# Patient Record
Sex: MALE | Race: WHITE
[De-identification: names, ages, dates, MRNs, and addresses within clinical notes are randomized per-mention and may not be internally consistent; named-entity substitution may affect disease eponyms.]

---

## 2017-05-19 ENCOUNTER — HOSPITAL ENCOUNTER (EMERGENCY)
Dept: HOSPITAL 47 - EC | Age: 62
Discharge: HOME | End: 2017-05-19
Payer: MEDICARE

## 2017-05-19 VITALS — SYSTOLIC BLOOD PRESSURE: 105 MMHG | DIASTOLIC BLOOD PRESSURE: 59 MMHG | HEART RATE: 78 BPM

## 2017-05-19 VITALS — TEMPERATURE: 98.3 F

## 2017-05-19 VITALS — RESPIRATION RATE: 18 BRPM

## 2017-05-19 DIAGNOSIS — F03.90: ICD-10-CM

## 2017-05-19 DIAGNOSIS — K11.20: Primary | ICD-10-CM

## 2017-05-19 DIAGNOSIS — J32.9: ICD-10-CM

## 2017-05-19 DIAGNOSIS — Z79.899: ICD-10-CM

## 2017-05-19 DIAGNOSIS — Z79.82: ICD-10-CM

## 2017-05-19 DIAGNOSIS — Z79.51: ICD-10-CM

## 2017-05-19 DIAGNOSIS — D17.0: ICD-10-CM

## 2017-05-19 LAB
ALP SERPL-CCNC: 167 U/L (ref 38–126)
ALT SERPL-CCNC: 30 U/L (ref 21–72)
AMYLASE SERPL-CCNC: 95 U/L (ref 30–110)
ANION GAP SERPL CALC-SCNC: 13 MMOL/L
AST SERPL-CCNC: 30 U/L (ref 17–59)
BASOPHILS # BLD AUTO: 0.1 K/UL (ref 0–0.2)
BASOPHILS NFR BLD AUTO: 1 %
BUN SERPL-SCNC: 29 MG/DL (ref 9–20)
CALCIUM SPEC-MCNC: 9.1 MG/DL (ref 8.4–10.2)
CH: 34.6
CHCM: 34
CHLORIDE SERPL-SCNC: 106 MMOL/L (ref 98–107)
CO2 SERPL-SCNC: 22 MMOL/L (ref 22–30)
EOSINOPHIL # BLD AUTO: 0.2 K/UL (ref 0–0.7)
EOSINOPHIL NFR BLD AUTO: 2 %
ERYTHROCYTE [DISTWIDTH] IN BLOOD BY AUTOMATED COUNT: 3.82 M/UL (ref 4.3–5.9)
ERYTHROCYTE [DISTWIDTH] IN BLOOD: 13.5 % (ref 11.5–15.5)
GLUCOSE SERPL-MCNC: 100 MG/DL (ref 74–99)
HCT VFR BLD AUTO: 39.1 % (ref 39–53)
HDW: 2.32
HGB BLD-MCNC: 13.2 GM/DL (ref 13–17.5)
LUC NFR BLD AUTO: 2 %
LYMPHOCYTES # SPEC AUTO: 1.1 K/UL (ref 1–4.8)
LYMPHOCYTES NFR SPEC AUTO: 10 %
MCH RBC QN AUTO: 34.6 PG (ref 25–35)
MCHC RBC AUTO-ENTMCNC: 33.9 G/DL (ref 31–37)
MCV RBC AUTO: 102.3 FL (ref 80–100)
MONOCYTES # BLD AUTO: 1.1 K/UL (ref 0–1)
MONOCYTES NFR BLD AUTO: 10 %
NEUTROPHILS # BLD AUTO: 8.7 K/UL (ref 1.3–7.7)
NEUTROPHILS NFR BLD AUTO: 76 %
NON-AFRICAN AMERICAN GFR(MDRD): >60
POTASSIUM SERPL-SCNC: 4.2 MMOL/L (ref 3.5–5.1)
PROT SERPL-MCNC: 7.8 G/DL (ref 6.3–8.2)
SODIUM SERPL-SCNC: 141 MMOL/L (ref 137–145)
WBC # BLD AUTO: 0.25 10*3/UL
WBC # BLD AUTO: 11.4 K/UL (ref 3.8–10.6)
WBC (PEROX): 10.81

## 2017-05-19 PROCEDURE — 85025 COMPLETE CBC W/AUTO DIFF WBC: CPT

## 2017-05-19 PROCEDURE — 80053 COMPREHEN METABOLIC PANEL: CPT

## 2017-05-19 PROCEDURE — 99284 EMERGENCY DEPT VISIT MOD MDM: CPT

## 2017-05-19 PROCEDURE — 87040 BLOOD CULTURE FOR BACTERIA: CPT

## 2017-05-19 PROCEDURE — 36415 COLL VENOUS BLD VENIPUNCTURE: CPT

## 2017-05-19 PROCEDURE — 82150 ASSAY OF AMYLASE: CPT

## 2017-05-19 PROCEDURE — 70491 CT SOFT TISSUE NECK W/DYE: CPT

## 2017-05-19 NOTE — ED
Recheck HPI





- General


Chief Complaint: Recheck/Abnormal Lab/Rx


Stated Complaint: abn labs, face swollen


Time Seen by Provider: 05/19/17 14:58


Source: patient, RN notes reviewed


Mode of arrival: ambulatory


Limitations: no limitations





- History of Present Illness


Initial Comments: 





This a 62-year-old male presents emergency Department chief complaint of facial 

swelling, elevated white blood cell count.  Patient states that the swelling 

and redness yesterday went primary care physician's office who ordered lab work 

and told him come emergency department secondary to elevated white blood cell 

count.  Patient some similar to this a few months ago and was diagnosed with 

cellulitis.  They're concerned about his parotid gland or possible abscess.  

Patient denies fever or chills.  Denies any dental pain, difficult to swelling, 

headache, dizziness, chest pain or shortness breath.





- Related Data


 Home Medications











 Medication  Instructions  Recorded  Confirmed


 


Albuterol Inhaler [Ventolin Hfa 2 puff INHALATION RT-BID 05/19/17 05/19/17





Inhaler]   


 


Ascorbic Acid [Vitamin C] 1,000 mg PO W/SUPPER 05/19/17 05/19/17


 


Aspirin EC [Ecotrin Low Dose] 81 mg PO W/SUPPER 05/19/17 05/19/17


 


Atorvastatin [Lipitor] 10 mg PO HS 05/19/17 05/19/17


 


Calcium Carbonate [Calcium] 600 mg PO BID 05/19/17 05/19/17


 


Cholecalciferol [Vitamin D3] 1,000 unit PO W/SUPPER 05/19/17 05/19/17


 


Ciprofloxacin HCl [Cipro] 500 mg PO BID 05/19/17 05/19/17


 


Citalopram Hydrobromide [CeleXA] 20 mg PO DAILY 05/19/17 05/19/17


 


Cyanocobalamin (Vitamin B-12) 2,000 mcg PO QAM 05/19/17 05/19/17





[Vitamin B-12]   


 


Donepezil [Aricept] 10 mg PO QAM 05/19/17 05/19/17


 


Fish Oil/Dha/Epa [Fish Oil 1,200 2 cap PO BID 05/19/17 05/19/17





mg Fish Oil]   


 


Fluticasone/Salmeterol [Advair Hfa 2 puff INHALATION RT-BID 05/19/17 05/19/17





230-21 Mcg Inhaler]   


 


Garlic 1 tab PO W/SUPPER 05/19/17 05/19/17


 


Gatifloxacin [Zymaxid] 1 drop BOTH EYES DAILY PRN 05/19/17 05/19/17


 


Gemfibrozil [Lopid] 600 mg PO AC-BID 05/19/17 05/19/17


 


Magnesium Oxide [Mag-Ox] 400 mg PO W/SUPPER 05/19/17 05/19/17


 


Meloxicam [Mobic] 7.5 mg PO DAILY 05/19/17 05/19/17


 


Memantine [Namenda] 10 mg PO BID 05/19/17 05/19/17


 


Metamucil Wafer 1 wafer PO QAM 05/19/17 05/19/17


 


Niacin (Inositol Niacinate) 400 mg PO BID 05/19/17 05/19/17





[Niacin Flush Free 500 mg Cap]   


 


Omeprazole 20 mg PO DAILY 05/19/17 05/19/17








 Previous Rx's











 Medication  Instructions  Recorded


 


Amoxicillin/Potassium Clav 1 tab PO Q12HR #20 tab 05/19/17





[Augmentin 875-125 Tablet]  











 Allergies











Allergy/AdvReac Type Severity Reaction Status Date / Time


 


No Known Allergies Allergy   Verified 05/19/17 15:42














Review of Systems


ROS Statement: 


Those systems with pertinent positive or pertinent negative responses have been 

documented in the HPI.





ROS Other: All systems not noted in ROS Statement are negative.





Past Medical History


Past Medical History: Dementia


Additional Past Medical History / Comment(s): Downs syndrome


History of Any Multi-Drug Resistant Organisms: None Reported


Past Surgical History: Appendectomy


Additional Past Surgical History / Comment(s): hip replacement Right side. 

lower right lobe from lug removed.


Past Psychological History: No Psychological Hx Reported


Smoking Status: Never smoker


Past Alcohol Use History: None Reported


Past Drug Use History: None Reported





General Exam


Limitations: no limitations


General appearance: alert, in no apparent distress


Head exam: Present: atraumatic, normocephalic, normal inspection


Eye exam: Present: normal appearance, PERRL, EOMI.  Absent: scleral icterus, 

conjunctival injection, periorbital swelling


ENT exam: Present: mucous membranes moist, TM's normal bilaterally, normal 

external ear exam.  Absent: normal exam (Swollen noted along the left side of 

the mandible angle), normal oropharynx (Some missing dentition noted no abscess 

or abnormal lesions noted)


Neck exam: Present: normal inspection, full ROM.  Absent: tenderness, 

meningismus, lymphadenopathy


Respiratory exam: Present: normal lung sounds bilaterally.  Absent: respiratory 

distress, wheezes, rales, rhonchi, stridor


Cardiovascular Exam: Present: regular rate, normal rhythm, normal heart sounds.

  Absent: systolic murmur, diastolic murmur, rubs, gallop, clicks


Neurological exam: Present: alert


Skin exam: Present: warm, dry, intact, normal color.  Absent: rash





Course


 Vital Signs











  05/19/17 05/19/17





  12:37 15:53


 


Temperature 98.3 F 


 


Pulse Rate 64 72


 


Respiratory 20 18





Rate  


 


Blood Pressure 108/55 109/57


 


O2 Sat by Pulse 97 96





Oximetry  














Medical Decision Making





- Medical Decision Making


This is a 62-year-old male presented for facial swelling.  Patient CT does show 

x-ray shows parotic gland though he has a normal amylase.  Patient had 

improvement of his white count 18-11 today.  Case discussed Dr. Moreno and he'll 

be placed on antibiotics for prostatitis.  We did discuss increasing salivation 

with foods and drinks.  Patient was on Cipro and were discussed including 

clindamycin to his Cipro treatment really prefers single antibiotic and which 

she'll be switched to Augmentin.








- Lab Data


Result diagrams: 


 05/19/17 15:38





 05/19/17 15:38


 Lab Results











  05/19/17 05/19/17 Range/Units





  15:38 15:38 


 


WBC   11.4 H  (3.8-10.6)  k/uL


 


RBC   3.82 L  (4.30-5.90)  m/uL


 


Hgb   13.2  (13.0-17.5)  gm/dL


 


Hct   39.1  (39.0-53.0)  %


 


MCV   102.3 H  (80.0-100.0)  fL


 


MCH   34.6  (25.0-35.0)  pg


 


MCHC   33.9  (31.0-37.0)  g/dL


 


RDW   13.5  (11.5-15.5)  %


 


Plt Count   369  (150-450)  k/uL


 


Neutrophils %   76  %


 


Lymphocytes %   10  %


 


Monocytes %   10  %


 


Eosinophils %   2  %


 


Basophils %   1  %


 


Neutrophils #   8.7 H  (1.3-7.7)  k/uL


 


Lymphocytes #   1.1  (1.0-4.8)  k/uL


 


Monocytes #   1.1 H  (0-1.0)  k/uL


 


Eosinophils #   0.2  (0-0.7)  k/uL


 


Basophils #   0.1  (0-0.2)  k/uL


 


Macrocytosis   Slight  


 


Sodium  141   (137-145)  mmol/L


 


Potassium  4.2   (3.5-5.1)  mmol/L


 


Chloride  106   ()  mmol/L


 


Carbon Dioxide  22   (22-30)  mmol/L


 


Anion Gap  13   mmol/L


 


BUN  29 H   (9-20)  mg/dL


 


Creatinine  1.13   (0.66-1.25)  mg/dL


 


Est GFR (MDRD) Af Amer  >60   (>60 ml/min/1.73 sqM)  


 


Est GFR (MDRD) Non-Af  >60   (>60 ml/min/1.73 sqM)  


 


Glucose  100 H   (74-99)  mg/dL


 


Calcium  9.1   (8.4-10.2)  mg/dL


 


Total Bilirubin  0.6   (0.2-1.3)  mg/dL


 


AST  30   (17-59)  U/L


 


ALT  30   (21-72)  U/L


 


Alkaline Phosphatase  167 H   ()  U/L


 


Total Protein  7.8   (6.3-8.2)  g/dL


 


Albumin  3.8   (3.5-5.0)  g/dL


 


Amylase  95   ()  U/L














Disposition


Clinical Impression: 


 Parotitis





Disposition: HOME SELF-CARE


Condition: Stable


Instructions:  Sialoadenitis (ED)


Additional Instructions: 


Please return to the Emergency Department if symptoms worsen or any other 

concerns.


Prescriptions: 


Amoxicillin/Potassium Clav [Augmentin 875-125 Tablet] 1 tab PO Q12HR #20 tab


Referrals: 


Freddie Moreno MD [Primary Care Provider] - 1-2 days


Time of Disposition: 17:53

## 2017-05-19 NOTE — CT
EXAMINATION TYPE: CT soft tissue neck w con

 

DATE OF EXAM: 5/19/2017 4:53 PM

 

COMPARISON: NONE

 

HISTORY: 62-year-old male with neck swelling and pain since yesterday

 

TECHNIQUE: Contiguous axial scanning of the neck performed with IV Contrast, patient injected with 10
0 mL of Omnipaque 300. Coronal/sagittal reconstructions performed.

 

CT DLP: 488.30 mGycm

Automated exposure control for dose reduction was used.

 

FINDINGS: 

Visualized intracranial structures, orbits and globes, mastoid air cells appear within normal limits.
 There is moderate mucosal thickening within the maxillary sinuses and small air-fluid levels in the 
maxillary and left sphenoid sinuses. Leftward nasal septal deviation.

 

Nasopharynx is clear.  Hypertrophy of the lingual tonsils. Some asymmetry of the left aryepiglottic f
old probably due to positioning. Glottic and subglottic airway, trachea, and visualized upper lungs a
ppear clear. The epiglottis and prevertebral soft tissues are within normal limits.

 

Thyroid gland and submandibular glands are within normal limits. Right parotid gland appears atrophic
.

 

There is enlargement and enhancement diffusely throughout the left parotid gland with adjacent subcut
aneous fat stranding and mild tracking edema. There may be some more nodular areas of enhancement speedy
ng the parotid tail, for example, axial image 64.

 

Asymmetric skin thickening along the left side of the face, jaw, and upper neck with reticulations in
 the subcutaneous fat here.

 

Overlying the left platysma muscle at the anterior upper neck, there is a 3.8 x 1.6 cm ovoid fat dens
ity lesion that has mild mass effect onto the underlying platysma suggestive of a lipoma.

 

Borderline enlarged left greater than right upper cervical lymph nodes measuring up to 1.3 cm on the 
left and 1.1 cm on the right. Some asymmetrically enlarged left submandibular space lymph nodes measu
re up to 9 mm.

 

Degenerative changes mid to lower cervical spine.

 

 

 

IMPRESSION: 

 

1. ASYMMETRIC ENLARGEMENT WITH DIFFUSE ENHANCEMENT AND SURROUNDING INFLAMMATION OF THE LEFT PAROTID G
LAND. CORRELATE FOR PAROTITIS.

2. ASSOCIATED CELLULITIS ALONG THE LEFT SIDE OF THE FACE, JAW, AND UPPER NECK.

3. BORDERLINE TO MILDLY ENLARGED LEFT GREATER THAN RIGHT UPPER CERVICAL LYMPH NODES LIKELY REACTIVE. 
RECOMMEND CLINICAL FOLLOW-UP TO ENSURE THAT THESE GRADUALLY DECREASE IN SIZE WITH TREATMENT.

4. INCIDENTAL 3.8 CM LIPOMA ANTERIOR LEFT UPPER NECK OVERLYING THE PLATYSMA.

5. CORRELATE FOR ACUTE ON CHRONIC SINUSITIS..

## 2019-01-18 ENCOUNTER — HOSPITAL ENCOUNTER (OUTPATIENT)
Dept: HOSPITAL 47 - OR | Age: 64
Discharge: HOME | End: 2019-01-18
Attending: DENTIST
Payer: MEDICARE

## 2019-01-18 VITALS — HEART RATE: 85 BPM | DIASTOLIC BLOOD PRESSURE: 54 MMHG | SYSTOLIC BLOOD PRESSURE: 117 MMHG

## 2019-01-18 VITALS — BODY MASS INDEX: 38.5 KG/M2

## 2019-01-18 VITALS — RESPIRATION RATE: 16 BRPM

## 2019-01-18 VITALS — TEMPERATURE: 98.4 F

## 2019-01-18 DIAGNOSIS — Z79.899: ICD-10-CM

## 2019-01-18 DIAGNOSIS — Q90.9: ICD-10-CM

## 2019-01-18 DIAGNOSIS — K13.5: ICD-10-CM

## 2019-01-18 DIAGNOSIS — K13.6: ICD-10-CM

## 2019-01-18 DIAGNOSIS — F02.80: ICD-10-CM

## 2019-01-18 DIAGNOSIS — K14.0: ICD-10-CM

## 2019-01-18 DIAGNOSIS — G30.9: ICD-10-CM

## 2019-01-18 DIAGNOSIS — K12.1: Primary | ICD-10-CM

## 2019-01-18 PROCEDURE — 41116 EXCISION OF MOUTH LESION: CPT

## 2019-01-18 PROCEDURE — 88305 TISSUE EXAM BY PATHOLOGIST: CPT

## 2019-01-18 NOTE — P.OP
Date of Procedure: 01/18/19


Preoperative Diagnosis: 


Nonspecific ulcer right floor of mouth ventral tongue


Postoperative Diagnosis: 


Same


Procedure(s) Performed: 


Excision of ulcer and primary closure


Implants: 


None


Anesthesia: RODRIGO


Surgeon: Luke Hutchins


Estimated Blood Loss (ml): 5


IV fluids (ml): 500


Urine output (ml): 0


Pathology: other (In formalin unable to orient)


Condition: stable


Disposition: PACU


Indications for Procedure: 


Patient had had previous biopsy that was negative in July but the ulcer on the 

tongue had opened post operatively and never healed.  There is a shallow 

noninflammatory ulcer approximately 1 cm x 1 cm on the right floor of mouth 

ventral tongue or the tongue meets the mandible.  This was difficult access in 

the best of scenarios but with the patient's cognitive disabilities as well as 

extreme dry mouth as well as difficult airway of the decision was made to take 

the patient to the hospital and perform under general endotracheal tube 

anesthesia.


Operative Findings: 


Difficult access


Description of Procedure: 


Patient was seen in the preoperative holding area with sister and caretaker 

present consent reviewed including but not limited to bleeding pain infection 

swelling recurrence of disease need for additional procedures in the future 

damage to adjacent structures including but not limited to the lingual nerve 

resulting in numbness or paralysis of the tongue and the submandibular duct 

which resulted in damage to the function of the submandibular gland and 

possible need for removal in the future.





Patient's and taken to the operating room placed in supine position intubated 

easily per the anesthesia record.  Patient was draped in the usual fashion for 

clean contaminated case.  A 1 cm x 1 cm shallow ulcer was confirmed on the 

right ventral tongue floor of mouth.  All the way and the posterior where the 

floor of mouth meets the mandibular gingiva.  Access was very difficult and the 

tongue had to be retracted firmly in order to reach the area.  Excision with 1-

2 mm margins was achieved and primary closure.  During the excision the some 

mandibular duct was visualized and noted to be intact.  Primary closure with 3-

0 chromic gut suture in a interrupted fashion using mattress sutures was 

achieved.  10 sutures were used to close a 2 cm incision.





Postoperative instructions were reviewed with the sister including liquid diet 

until he was seen in the office in 2 weeks.





Plan - Discharge Summary


New Discharge Prescriptions: 


No Action


   Fish Oil/Dha/Epa [Fish Oil 1,200 mg Fish Oil] 1,000 cap PO BID


   Garlic 1 tab PO W/SUPPER


   Magnesium Oxide [Mag-Ox] 250 mg PO W/SUPPER


   Cholecalciferol [Vitamin D3] 2,000 unit PO W/SUPPER


   Calcium Carbonate [Calcium] 600 mg PO BID


   Ascorbic Acid [Vitamin C] 1,000 mg PO W/SUPPER


   Cyanocobalamin (Vitamin B-12) [Vitamin B-12] 1,000 mcg PO QAM


   Atorvastatin [Lipitor] 20 mg PO HS


   Meloxicam [Mobic] 7.5 mg PO DAILY


   Citalopram Hydrobromide [CeleXA] 20 mg PO QAM


   Memantine [Namenda] 10 mg PO BID


   Donepezil [Aricept] 10 mg PO QAM


   Ferrous Sulfate [Iron (65 MG Elemental)] 325 mg PO DAILY


   Menthol/Zinc Oxide [Calmoseptine Ointment] 1 applic TOPICAL DAILY


   Midodrine [ProAmatine] 5 mg PO TID


   Calcium Carbonate [Calcium] 1,000 mg PO DAILY


Discharge Medication List





Ascorbic Acid [Vitamin C] 1,000 mg PO W/SUPPER 05/19/17 [History]


Atorvastatin [Lipitor] 20 mg PO HS 05/19/17 [History]


Calcium Carbonate [Calcium] 600 mg PO BID 05/19/17 [History]


Cholecalciferol [Vitamin D3] 2,000 unit PO W/SUPPER 05/19/17 [History]


Citalopram Hydrobromide [CeleXA] 20 mg PO QAM 05/19/17 [History]


Cyanocobalamin (Vitamin B-12) [Vitamin B-12] 1,000 mcg PO QAM 05/19/17 [History]


Donepezil [Aricept] 10 mg PO QAM 05/19/17 [History]


Fish Oil/Dha/Epa [Fish Oil 1,200 mg Fish Oil] 1,000 cap PO BID 05/19/17 [History

]


Garlic 1 tab PO W/SUPPER 05/19/17 [History]


Magnesium Oxide [Mag-Ox] 250 mg PO W/SUPPER 05/19/17 [History]


Meloxicam [Mobic] 7.5 mg PO DAILY 05/19/17 [History]


Memantine [Namenda] 10 mg PO BID 05/19/17 [History]


Calcium Carbonate [Calcium] 1,000 mg PO DAILY 01/16/19 [History]


Ferrous Sulfate [Iron (65 MG Elemental)] 325 mg PO DAILY 01/16/19 [History]


Menthol/Zinc Oxide [Calmoseptine Ointment] 1 applic TOPICAL DAILY 01/16/19 [

History]


Midodrine [ProAmatine] 5 mg PO TID 01/16/19 [History]

## 2019-04-16 ENCOUNTER — HOSPITAL ENCOUNTER (INPATIENT)
Dept: HOSPITAL 47 - EC | Age: 64
LOS: 2 days | Discharge: HOME | DRG: 194 | End: 2019-04-18
Attending: HOSPITALIST | Admitting: HOSPITALIST
Payer: MEDICARE

## 2019-04-16 DIAGNOSIS — Z80.7: ICD-10-CM

## 2019-04-16 DIAGNOSIS — J12.9: Primary | ICD-10-CM

## 2019-04-16 DIAGNOSIS — J44.0: ICD-10-CM

## 2019-04-16 DIAGNOSIS — F32.9: ICD-10-CM

## 2019-04-16 DIAGNOSIS — R77.9: ICD-10-CM

## 2019-04-16 DIAGNOSIS — J45.21: ICD-10-CM

## 2019-04-16 DIAGNOSIS — Z96.641: ICD-10-CM

## 2019-04-16 DIAGNOSIS — T38.0X5A: ICD-10-CM

## 2019-04-16 DIAGNOSIS — F03.90: ICD-10-CM

## 2019-04-16 DIAGNOSIS — D72.829: ICD-10-CM

## 2019-04-16 DIAGNOSIS — J15.9: ICD-10-CM

## 2019-04-16 DIAGNOSIS — I10: ICD-10-CM

## 2019-04-16 DIAGNOSIS — R09.02: ICD-10-CM

## 2019-04-16 DIAGNOSIS — Z80.41: ICD-10-CM

## 2019-04-16 DIAGNOSIS — E87.2: ICD-10-CM

## 2019-04-16 DIAGNOSIS — F79: ICD-10-CM

## 2019-04-16 DIAGNOSIS — Q90.9: ICD-10-CM

## 2019-04-16 DIAGNOSIS — Z79.899: ICD-10-CM

## 2019-04-16 DIAGNOSIS — Z90.2: ICD-10-CM

## 2019-04-16 DIAGNOSIS — K21.9: ICD-10-CM

## 2019-04-16 DIAGNOSIS — Z90.49: ICD-10-CM

## 2019-04-16 DIAGNOSIS — Z82.49: ICD-10-CM

## 2019-04-16 PROCEDURE — 85025 COMPLETE CBC W/AUTO DIFF WBC: CPT

## 2019-04-16 PROCEDURE — 94760 N-INVAS EAR/PLS OXIMETRY 1: CPT

## 2019-04-16 PROCEDURE — 96365 THER/PROPH/DIAG IV INF INIT: CPT

## 2019-04-16 PROCEDURE — 87040 BLOOD CULTURE FOR BACTERIA: CPT

## 2019-04-16 PROCEDURE — 94640 AIRWAY INHALATION TREATMENT: CPT

## 2019-04-16 PROCEDURE — 80053 COMPREHEN METABOLIC PANEL: CPT

## 2019-04-16 PROCEDURE — 99285 EMERGENCY DEPT VISIT HI MDM: CPT

## 2019-04-16 RX ADMIN — CEFAZOLIN SCH MLS/HR: 330 INJECTION, POWDER, FOR SOLUTION INTRAMUSCULAR; INTRAVENOUS at 20:21

## 2019-04-16 NOTE — HP
HISTORY AND PHYSICAL



DATE OF ADMISSION:

04/16/2019



DATE OF SERVICE:

04/16/2019



PRESENTING COMPLAINT:

Cough, congested.



HISTORY OF PRESENTING COMPLAINT:

This is a pleasant 63-year-old patient who follows with Dr. Moreno.  Patient was taken

to his PCP about 2 weeks ago with congested cough, shortness of breath, was given

antibiotics, to which he did not respond. In face, he started to get worse.  He also

received steroids. The patient subsequently was sent down here after a CT scan showing

bilateral infiltrates.  Patient is wheezing, coughing, getting more and more short of

breath. Chronic stable medical conditions include Down syndrome with mental

retardation, GERD, hypertension.  The patient also was wheezing. Did tolerate some

diet. Was able to answer some simple questions.



REVIEW OF SYSTEMS:

CONSTITUTIONAL: Weak, tired. Probably had a fever.

HEENT:  Congested.

RESPIRATORY: As above. Shortness of breath, cough.

CARDIOVASCULAR: None.

GASTROINTESTINAL: None.

GENITOURINARY: None.

MUSCULOSKELETAL: None.

DERMATOLOGICAL: None.

HEMATOLOGICAL: None.

LYMPHATICS: None.

PSYCHIATRY: Mental retardation.

NEUROLOGICAL: None.



PAST MEDICAL HISTORY:

1. Asthma.

2. Mental retardation.

3. GERD.

4. Hypertension.

5. Down syndrome.



PAST SURGICAL HISTORY:

Not known.



HOME MEDICATIONS:

1. Magnesium oxide 250 mg with supper.

2. Garlic 1 tablet with supper.

3. Vitamin D3 2000 units with supper.

4. Tums 1000 mg p.o. daily.

5. Vitamin C 1000 mg with supper.

6. Fish oil 1000 mg b.i.d.

7. Iron 325 p.o. daily.

8. Calcium 600 mg b.i.d.

9. Ocuflox 1 drop to both eyes b.i.d. p.r.n.

10.Calmoseptine topically daily.

11.Vitamin B12 1000 mcg p.o. daily.

12.Midodrine 5 mg p.o. t.i.d.

13.Namenda 10 mg p.o. b.i.d.

14.Mobic 7.5 p.o. daily.

15.Aricept 10 mg p.o. daily.

16.Lipitor 20 mg p.o. daily.

17.Celexa 20 mg p.o. daily.



ALLERGIES:

NONE.



PHYSICAL EXAMINATION:

Temperature 97.2, pulse 55, respiration 20, blood pressure 103/62, pulse ox 97% on 2 L.

GENERAL APPEARANCE: Well built; BMI 38.7.  Lying in bed.

EYES: Pupils equal. Conjunctivae normal.

HEENT: External appearance of nose and ears normal. Oral cavity has some aphthous

ulcers. Ears are low-set. Short neck.

NECK:  JVD not raised.  Mass not palpable.

RESPIRATORY: Effort increased.

LUNGS:  Diminished breath sounds.  Some expiratory crackles.

CARDIOVASCULAR: First and second sounds normal.  No edema.

ABDOMEN:  Soft, nontender.  Liver and spleen not palpable.

LYMPHATIC: No lymph node palpable in neck or axillae.

PSYCHIATRY: Patient can answer some questions.

NEUROLOGICAL: Pupils equal. No facial asymmetry. Power and sensation grossly intact.



INVESTIGATIONS:

Patient's lab work from [facility] shows influenza A and B negative.  Troponin I

0.157.  BUN 34, creatinine 1.4, potassium 4.6.  Lactic acid 2.3.  White count 23.7,

hemoglobin 13.1, platelets 210.  ProBNP 382.



ASSESSMENT:

1. Acute bilateral pneumonitis; could be viral; cannot rule out a bacterial cause.

2. Acute exacerbation of intermittent asthma.

3. Gastroesophageal reflux disease.

4. Essential hypertension.

5. Down syndrome.

6. Chronic mental retardation.

7. Lactic acidosis from above.

8. Troponin leak from hemodynamic mismatch.  No clinical evidence of acute coronary

    syndrome.

9. Leukocytosis, probably from patient being on steroids.



PLAN:

At this point patient will be started on bronchodilators, inhaled and IV steroids.

Will also add Mucinex. Will supplemental oxygen, give IV fluids.  Other home

medications are resumed.  Lovenox for DVT prophylaxis.  Currently no family is present.





MMODL / IJN: [id number] / Job#: [id number]

## 2019-04-16 NOTE — ED
SOB HPI





- General


Chief Complaint: Shortness of Breath


Stated Complaint: Sepsis, Elevated trop


Time Seen by Provider: 04/16/19 19:28


Source: patient, EMS, RN notes reviewed, old records reviewed


Mode of arrival: EMS


Limitations: altered mental status





- History of Present Illness


Initial Comments: 





This is a 63-year-old male the ER for evaluation.  Patient is accepted in 

transfer from [facility] where he presented with shortness of breath.  

Patient apparently had elevated troponin and a chest pain.  Patient is 

transferred to our hospital for evaluation regards to COPD pneumonia and 

hypoxia.


MD Complaint: shortness of breath, cough


-: days(s)


Severity: moderate


Consistency: constant, now resolved


Improves With: oxygen, rest, bronchodilators


Worsens With: movement


Known History Of: COPD, asthma


Context: recent URI


Associated Symptoms: cough, sputum production


Treatments Prior to Arrival: oxygen, bronchodilator





- Related Data


                                Home Medications











 Medication  Instructions  Recorded  Confirmed


 


Ascorbic Acid [Vitamin C] 1,000 mg PO W/SUPPER 05/19/17 04/16/19


 


Calcium Carbonate [Calcium] 600 mg PO BID 05/19/17 04/16/19


 


Cholecalciferol [Vitamin D3] 2,000 unit PO W/SUPPER 05/19/17 04/16/19


 


Citalopram Hydrobromide [CeleXA] 20 mg PO QAM 05/19/17 04/16/19


 


Cyanocobalamin (Vitamin B-12) 1,000 mcg PO QAM 05/19/17 04/16/19





[Vitamin B-12]   


 


Donepezil [Aricept] 10 mg PO QAM 05/19/17 04/16/19


 


Fish Oil/Dha/Epa [Fish Oil 1,200 1,000 cap PO BID 05/19/17 04/16/19





mg Fish Oil]   


 


Garlic 1 tab PO W/SUPPER 05/19/17 04/16/19


 


Meloxicam [Mobic] 7.5 mg PO DAILY 05/19/17 04/16/19


 


Memantine [Namenda] 10 mg PO BID 05/19/17 04/16/19


 


Ferrous Sulfate [Iron (65  mg PO DAILY 01/16/19 04/16/19





Elemental)]   


 


Menthol/Zinc Oxide [Calmoseptine 1 applic TOPICAL DAILY 01/16/19 04/16/19





Ointment]   


 


Midodrine [ProAmatine] 5 mg PO TID 01/16/19 04/16/19


 


Atorvastatin [Lipitor] 20 mg PO DAILY 04/16/19 04/16/19


 


Calcium Carbonate [Tums] 1,000 mg PO DAILY 04/16/19 04/16/19


 


Magnesium Oxide [Mag-Ox] 250 mg PO W/SUPPER 04/16/19 04/16/19


 


Ofloxacin 0.3% Ophth Soln [Ocuflox 1 drops BOTH EYES BID PRN 04/16/19 04/16/19





Ophth Soln]   











                                    Allergies











Allergy/AdvReac Type Severity Reaction Status Date / Time


 


No Known Allergies Allergy   Verified 04/16/19 19:49














Review of Systems


ROS Statement: 


Those systems with pertinent positive or pertinent negative responses have been 

documented in the HPI.





ROS Other: All systems not noted in ROS Statement are negative.





Past Medical History


Past Medical History: Asthma, COPD, Dementia, GERD/Reflux, Hypertension


Additional Past Medical History / Comment(s): lesion under tongue on rt 

side,excoriation to buttocks,Downs syndrome,sister states "does very well in 

hospital settings."


History of Any Multi-Drug Resistant Organisms: None Reported


Past Surgical History: Appendectomy


Additional Past Surgical History / Comment(s): hip replacement Right side. lower

right lobe lung removed,partial removal under tongue


Past Anesthesia/Blood Transfusion Reactions: No Reported Reaction


Additional Past Anesthesia/Blood Transfusion Reaction / Comment(s): unknown hx 

blood transfusion


Past Psychological History: Depression


Smoking Status: Never smoker


Past Alcohol Use History: Occasional


Past Drug Use History: None Reported





- Past Family History


  ** Mother


Family Medical History: Cancer


Additional Family Medical History / Comment(s): lymphoma,ovarian





  ** Father


Family Medical History: Cancer


Additional Family Medical History / Comment(s): kidney,"weak heart"





General Exam


Limitations: altered mental status


General appearance: alert, in no apparent distress


Head exam: Present: atraumatic, normocephalic, normal inspection


Eye exam: Present: normal appearance, PERRL, EOMI.  Absent: scleral icterus, 

conjunctival injection, periorbital swelling


ENT exam: Present: normal exam, mucous membranes moist


Neck exam: Present: normal inspection.  Absent: tenderness, meningismus, 

lymphadenopathy


Respiratory exam: Present: respiratory distress, wheezes, accessory muscle use, 

decreased breath sounds, prolonged expiratory.  Absent: rales, rhonchi, stridor


Cardiovascular Exam: Present: regular rate, normal rhythm, normal heart sounds. 

Absent: systolic murmur, diastolic murmur, rubs, gallop, clicks


GI/Abdominal exam: Present: soft, normal bowel sounds.  Absent: distended, 

tenderness, guarding, rebound, rigid


Extremities exam: Present: normal inspection, full ROM, normal capillary refill.

 Absent: tenderness, pedal edema, joint swelling, calf tenderness


Back exam: Present: normal inspection


Neurological exam: Present: alert, oriented X3, CN II-XII intact


Psychiatric exam: Present: normal affect, normal mood


Skin exam: Present: warm, dry, intact, normal color.  Absent: rash





Course


                                   Vital Signs











  04/16/19 04/16/19 04/16/19





  19:16 19:36 19:38


 


Temperature 97.2 F L  


 


Pulse Rate 65  


 


Respiratory 20 20 





Rate   


 


Blood Pressure 103/62  102/58


 


O2 Sat by Pulse 97  98





Oximetry   














- Reevaluation(s)


Reevaluation #1: 





04/16/19 19:59


Medical record and transfer paperwork are reviewed


Reevaluation #2: 





04/16/19 19:59


A she is in no acute distress, denies any pain





Medical Decision Making





- Medical Decision Making





60 female the ER for evaluation, patient be admitted for IV antibiotics 

secondary to underlying infection, COPD with hypoxia.  Shortness of breath.





Disposition


Clinical Impression: 


 Community acquired pneumonia, Acute exacerbation of chronic obstructive airways

disease, Elevated troponin





Disposition: ADMITTED AS IP TO THIS HOSP


Condition: Fair


Is patient prescribed a controlled substance at d/c from ED?: No


Referrals: 


Freddie Moreno MD [Primary Care Provider] - 1-2 days

## 2019-04-17 LAB
ALBUMIN SERPL-MCNC: 3.3 G/DL (ref 3.5–5)
ALP SERPL-CCNC: 102 U/L (ref 38–126)
ALT SERPL-CCNC: 32 U/L (ref 21–72)
ANION GAP SERPL CALC-SCNC: 7 MMOL/L
AST SERPL-CCNC: 26 U/L (ref 17–59)
BASOPHILS # BLD AUTO: 0 K/UL (ref 0–0.2)
BASOPHILS NFR BLD AUTO: 0 %
BUN SERPL-SCNC: 23 MG/DL (ref 9–20)
CALCIUM SPEC-MCNC: 8.3 MG/DL (ref 8.4–10.2)
CHLORIDE SERPL-SCNC: 111 MMOL/L (ref 98–107)
CO2 SERPL-SCNC: 21 MMOL/L (ref 22–30)
EOSINOPHIL # BLD AUTO: 0 K/UL (ref 0–0.7)
EOSINOPHIL NFR BLD AUTO: 0 %
ERYTHROCYTE [DISTWIDTH] IN BLOOD BY AUTOMATED COUNT: 4.32 M/UL (ref 4.3–5.9)
ERYTHROCYTE [DISTWIDTH] IN BLOOD: 13.6 % (ref 11.5–15.5)
GLUCOSE SERPL-MCNC: 145 MG/DL (ref 74–99)
HCT VFR BLD AUTO: 43.4 % (ref 39–53)
HGB BLD-MCNC: 13.6 GM/DL (ref 13–17.5)
LYMPHOCYTES # SPEC AUTO: 0.3 K/UL (ref 1–4.8)
LYMPHOCYTES NFR SPEC AUTO: 2 %
MCH RBC QN AUTO: 31.5 PG (ref 25–35)
MCHC RBC AUTO-ENTMCNC: 31.3 G/DL (ref 31–37)
MCV RBC AUTO: 100.6 FL (ref 80–100)
MONOCYTES # BLD AUTO: 0.2 K/UL (ref 0–1)
MONOCYTES NFR BLD AUTO: 1 %
NEUTROPHILS # BLD AUTO: 13.9 K/UL (ref 1.3–7.7)
NEUTROPHILS NFR BLD AUTO: 95 %
PLATELET # BLD AUTO: 246 K/UL (ref 150–450)
POTASSIUM SERPL-SCNC: 4.6 MMOL/L (ref 3.5–5.1)
PROT SERPL-MCNC: 6.6 G/DL (ref 6.3–8.2)
SODIUM SERPL-SCNC: 139 MMOL/L (ref 137–145)
WBC # BLD AUTO: 14.6 K/UL (ref 3.8–10.6)

## 2019-04-17 RX ADMIN — CALCIUM CARBONATE (ANTACID) CHEW TAB 500 MG SCH MG: 500 CHEW TAB at 21:49

## 2019-04-17 RX ADMIN — IPRATROPIUM BROMIDE AND ALBUTEROL SULFATE SCH ML: .5; 3 SOLUTION RESPIRATORY (INHALATION) at 08:56

## 2019-04-17 RX ADMIN — CYANOCOBALAMIN TAB 500 MCG SCH MCG: 500 TAB at 08:18

## 2019-04-17 RX ADMIN — METHYLPREDNISOLONE SODIUM SUCCINATE SCH MG: 40 INJECTION, POWDER, FOR SOLUTION INTRAMUSCULAR; INTRAVENOUS at 21:50

## 2019-04-17 RX ADMIN — PIPERACILLIN AND TAZOBACTAM SCH MLS/HR: 3; .375 INJECTION, POWDER, FOR SOLUTION INTRAVENOUS at 07:01

## 2019-04-17 RX ADMIN — MIDODRINE HYDROCHLORIDE SCH: 5 TABLET ORAL at 16:50

## 2019-04-17 RX ADMIN — CALCIUM CARBONATE (ANTACID) CHEW TAB 500 MG SCH: 500 CHEW TAB at 08:07

## 2019-04-17 RX ADMIN — METHYLPREDNISOLONE SODIUM SUCCINATE SCH MG: 40 INJECTION, POWDER, FOR SOLUTION INTRAMUSCULAR; INTRAVENOUS at 01:59

## 2019-04-17 RX ADMIN — OXYCODONE HYDROCHLORIDE AND ACETAMINOPHEN SCH MG: 500 TABLET ORAL at 17:00

## 2019-04-17 RX ADMIN — Medication SCH UNIT: at 17:00

## 2019-04-17 RX ADMIN — CEFAZOLIN SCH: 330 INJECTION, POWDER, FOR SOLUTION INTRAMUSCULAR; INTRAVENOUS at 08:07

## 2019-04-17 RX ADMIN — METHYLPREDNISOLONE SODIUM SUCCINATE SCH MG: 40 INJECTION, POWDER, FOR SOLUTION INTRAMUSCULAR; INTRAVENOUS at 16:55

## 2019-04-17 RX ADMIN — ATORVASTATIN CALCIUM SCH MG: 20 TABLET, FILM COATED ORAL at 08:18

## 2019-04-17 RX ADMIN — CALCIUM CARBONATE (ANTACID) CHEW TAB 500 MG SCH MG: 500 CHEW TAB at 08:18

## 2019-04-17 RX ADMIN — CITALOPRAM HYDROBROMIDE SCH MG: 20 TABLET ORAL at 08:18

## 2019-04-17 RX ADMIN — IPRATROPIUM BROMIDE AND ALBUTEROL SULFATE SCH ML: .5; 3 SOLUTION RESPIRATORY (INHALATION) at 19:14

## 2019-04-17 RX ADMIN — CEFAZOLIN SCH MLS/HR: 330 INJECTION, POWDER, FOR SOLUTION INTRAMUSCULAR; INTRAVENOUS at 17:01

## 2019-04-17 RX ADMIN — PIPERACILLIN AND TAZOBACTAM SCH MLS/HR: 3; .375 INJECTION, POWDER, FOR SOLUTION INTRAVENOUS at 15:04

## 2019-04-17 RX ADMIN — MEMANTINE HYDROCHLORIDE SCH MG: 10 TABLET ORAL at 01:59

## 2019-04-17 RX ADMIN — DONEPEZIL HYDROCHLORIDE SCH MG: 10 TABLET ORAL at 08:18

## 2019-04-17 RX ADMIN — MEMANTINE HYDROCHLORIDE SCH MG: 10 TABLET ORAL at 21:49

## 2019-04-17 RX ADMIN — MELOXICAM SCH MG: 7.5 TABLET ORAL at 08:19

## 2019-04-17 RX ADMIN — Medication SCH MG: at 17:00

## 2019-04-17 RX ADMIN — MIDODRINE HYDROCHLORIDE SCH MG: 5 TABLET ORAL at 01:59

## 2019-04-17 RX ADMIN — MIDODRINE HYDROCHLORIDE SCH: 5 TABLET ORAL at 08:08

## 2019-04-17 RX ADMIN — MEMANTINE HYDROCHLORIDE SCH MG: 10 TABLET ORAL at 08:19

## 2019-04-17 RX ADMIN — IPRATROPIUM BROMIDE AND ALBUTEROL SULFATE SCH ML: .5; 3 SOLUTION RESPIRATORY (INHALATION) at 13:33

## 2019-04-17 RX ADMIN — IPRATROPIUM BROMIDE AND ALBUTEROL SULFATE SCH: .5; 3 SOLUTION RESPIRATORY (INHALATION) at 13:30

## 2019-04-17 RX ADMIN — ENOXAPARIN SODIUM SCH MG: 40 INJECTION SUBCUTANEOUS at 08:18

## 2019-04-17 RX ADMIN — PIPERACILLIN AND TAZOBACTAM SCH MLS/HR: 3; .375 INJECTION, POWDER, FOR SOLUTION INTRAVENOUS at 21:47

## 2019-04-17 RX ADMIN — MIDODRINE HYDROCHLORIDE SCH MG: 5 TABLET ORAL at 21:49

## 2019-04-17 RX ADMIN — METHYLPREDNISOLONE SODIUM SUCCINATE SCH MG: 40 INJECTION, POWDER, FOR SOLUTION INTRAMUSCULAR; INTRAVENOUS at 07:01

## 2019-04-17 RX ADMIN — IPRATROPIUM BROMIDE AND ALBUTEROL SULFATE SCH ML: .5; 3 SOLUTION RESPIRATORY (INHALATION) at 15:34

## 2019-04-17 RX ADMIN — Medication SCH MG: at 08:18

## 2019-04-17 NOTE — PN
PROGRESS NOTE



DATE OF SERVICE:

04/17/2019.



PRESENTING COMPLAINT:

Congested cough.



INTERVAL HISTORY:

This patient presented with bilateral pneumonitis, exacerbation of asthma, still got a

wheezing cough.  Did tolerate a bit of diet.  Lying in bed.



REVIEW OF SYSTEMS:

Done for constitutional, cardiovascular, GI, pulmonary; relevant findings as above.



CURRENT MEDICATIONS:

Reviewed that include DuoNeb, IV Solu-Medrol.



PHYSICAL EXAMINATION:

VITAL SIGNS: On examination, afebrile.  Pulse 70, respiratory rate 16.  Blood pressure

130/70, pulse ox 95% on room air.

GENERAL APPEARANCE:  Lying in bed, awake.

EYES:  Pupils are equal. Conjunctivae normal.

NECK:  JVD not raised.  Mass not palpable.

RESPIRATORY:  Effort increased.

LUNGS:  Decreased breath sounds.  Some wheezing.  CARDIOVASCULAR:  1st and 2nd sounds

normal. No edema.

ABDOMEN:  Soft, nontender.  Liver and spleen not palpable.

PSYCHIATRY:  Awake.  Does answer simple questions.



INVESTIGATIONS:

White count 14.6, hemoglobin 13.6, potassium 4.6, BUN 23, creatinine 1.14.



ASSESSMENT:

1. Acute bilateral pneumonitis could be viral cannot rule out a bacterial cause.

2. Acute exacerbation of intermittent asthma.

3. Gastroesophageal reflux disease.

4. Essential hypertension.

5. Down syndrome.

6. Chronic mental retardation.

7. Lactic acidosis from above.

8. Troponin leak from hemodynamic mismatch.  No clinical evidence of acute coronary

    syndrome.

9. Leukocytosis probably from patient's use of steroids.



PLAN:

Continue current medication and treatment plan.  Patient's Solu-Medrol will be

maintained on 40 mg q.8.  patient also on IV Zosyn.





MMODL / IJN: [id number] / Job#: [id number]

## 2019-04-18 VITALS — HEART RATE: 85 BPM | TEMPERATURE: 96.8 F | SYSTOLIC BLOOD PRESSURE: 124 MMHG | DIASTOLIC BLOOD PRESSURE: 65 MMHG

## 2019-04-18 VITALS — RESPIRATION RATE: 18 BRPM

## 2019-04-18 RX ADMIN — METHYLPREDNISOLONE SODIUM SUCCINATE SCH MG: 40 INJECTION, POWDER, FOR SOLUTION INTRAMUSCULAR; INTRAVENOUS at 06:20

## 2019-04-18 RX ADMIN — ENOXAPARIN SODIUM SCH MG: 40 INJECTION SUBCUTANEOUS at 09:31

## 2019-04-18 RX ADMIN — Medication SCH MG: at 09:32

## 2019-04-18 RX ADMIN — DONEPEZIL HYDROCHLORIDE SCH MG: 10 TABLET ORAL at 09:32

## 2019-04-18 RX ADMIN — OXYCODONE HYDROCHLORIDE AND ACETAMINOPHEN SCH MG: 500 TABLET ORAL at 16:17

## 2019-04-18 RX ADMIN — ATORVASTATIN CALCIUM SCH MG: 20 TABLET, FILM COATED ORAL at 09:31

## 2019-04-18 RX ADMIN — CITALOPRAM HYDROBROMIDE SCH MG: 20 TABLET ORAL at 09:32

## 2019-04-18 RX ADMIN — CYANOCOBALAMIN TAB 500 MCG SCH MCG: 500 TAB at 09:31

## 2019-04-18 RX ADMIN — PIPERACILLIN AND TAZOBACTAM SCH MLS/HR: 3; .375 INJECTION, POWDER, FOR SOLUTION INTRAVENOUS at 16:16

## 2019-04-18 RX ADMIN — METHYLPREDNISOLONE SODIUM SUCCINATE SCH MG: 40 INJECTION, POWDER, FOR SOLUTION INTRAMUSCULAR; INTRAVENOUS at 16:17

## 2019-04-18 RX ADMIN — CALCIUM CARBONATE (ANTACID) CHEW TAB 500 MG SCH MG: 500 CHEW TAB at 09:31

## 2019-04-18 RX ADMIN — CALCIUM CARBONATE (ANTACID) CHEW TAB 500 MG SCH MG: 500 CHEW TAB at 09:32

## 2019-04-18 RX ADMIN — MEMANTINE HYDROCHLORIDE SCH MG: 10 TABLET ORAL at 09:32

## 2019-04-18 RX ADMIN — CEFAZOLIN SCH: 330 INJECTION, POWDER, FOR SOLUTION INTRAMUSCULAR; INTRAVENOUS at 17:16

## 2019-04-18 RX ADMIN — Medication SCH UNIT: at 16:17

## 2019-04-18 RX ADMIN — MIDODRINE HYDROCHLORIDE SCH MG: 5 TABLET ORAL at 09:32

## 2019-04-18 RX ADMIN — IPRATROPIUM BROMIDE AND ALBUTEROL SULFATE SCH: .5; 3 SOLUTION RESPIRATORY (INHALATION) at 16:18

## 2019-04-18 RX ADMIN — IPRATROPIUM BROMIDE AND ALBUTEROL SULFATE SCH ML: .5; 3 SOLUTION RESPIRATORY (INHALATION) at 11:34

## 2019-04-18 RX ADMIN — Medication SCH MG: at 16:18

## 2019-04-18 RX ADMIN — MELOXICAM SCH MG: 7.5 TABLET ORAL at 09:31

## 2019-04-18 RX ADMIN — PIPERACILLIN AND TAZOBACTAM SCH MLS/HR: 3; .375 INJECTION, POWDER, FOR SOLUTION INTRAVENOUS at 06:20

## 2019-04-18 RX ADMIN — CEFAZOLIN SCH: 330 INJECTION, POWDER, FOR SOLUTION INTRAMUSCULAR; INTRAVENOUS at 17:10

## 2019-04-18 RX ADMIN — IPRATROPIUM BROMIDE AND ALBUTEROL SULFATE SCH ML: .5; 3 SOLUTION RESPIRATORY (INHALATION) at 08:51

## 2019-04-18 RX ADMIN — MIDODRINE HYDROCHLORIDE SCH MG: 5 TABLET ORAL at 16:17

## 2019-04-19 NOTE — DS
DISCHARGE SUMMARY



DATE OF ADMISSION:

04/16/2019



DATE OF DISCHARGE:

04/18/2019



FINAL DIAGNOSES:

1. Acute bilateral pneumonitis could be viral cannot rule out a bacterial cause.

2. Acute exacerbation of intermittent asthma.

3. Gastroesophageal reflux disease.

4. Essential hypertension.

5. Down syndrome.

6. Chronic mental retardation.

7. Lactic acidosis from above.

8. Troponin leak from hemodynamic mismatch. No evidence of acute coronary syndrome.

9. Leukocytosis _____from steroids.



HOSPITAL COURSE:

This patient was transferred from an outside facility where he was getting treated for

his symptoms for 2 weeks prior to coming in having failed that.  The patient was found

to have bilateral infiltrates.  The patient was treated with bronchodilators,  steroids

and antibiotics to which he responded well.  By the time of discharge, doing really

well, sitting up, tolerating a diet.



On exam, lungs improved air entry.



Patient is on room air, tolerating a diet.



On examination, temperature 96.8 pulse 85, respiratory 18, blood pressure 124/65, pulse

ox 96% on room air.

LUNGS: Fair entry.

The patient is able answer simple questions.



DISCHARGE MEDICATIONS:

1. Vitamin C 1000 mg with supper.

2. Calcium 600 mg p.o. b.i.d.

3. Vitamin D3, 2000 units with supper.

4. Celexa 20 mg p.o. daily.

5. Vitamin B12, 1000 mcg p.o. daily.

6. Aricept 10 mg p.o. daily.

7. Fish oil 1000 mg p.o. b.i.d.

8. Garlic supplement.

9. Mobic 7.5 p.o. daily.

10.Namenda 10 mg p.o. b.i.d.

11.Ferrous sulfate 325 p.o. daily.

12.ProAmatine 5 mg p.o. t.i.d.

13.Lipitor 20 mg p.o. daily.

14.Tums 1000 mg p.o. daily.

15.Magnesium oxide 250 mg p.o. with supper.

16.Ocuflox 1 drop both eyes b.i.d. p.r.n.

17.Augmentin 875 1 tablet p.o. q.12, ten tablets.

18.Mucinex 1200 mg p.o. q.12, fourteen tablets.

19.Prednisone 40 mg from day 1 reduced by 10 mg daily until gone.

Follow up with Dr. Moreno on 04/25/2019,



Care was discussed with the family who was at the bedside.





MMODL / IJN: [id number] / Job#: [id number]